# Patient Record
Sex: FEMALE | Race: WHITE
[De-identification: names, ages, dates, MRNs, and addresses within clinical notes are randomized per-mention and may not be internally consistent; named-entity substitution may affect disease eponyms.]

---

## 2019-01-16 ENCOUNTER — HOSPITAL ENCOUNTER (OUTPATIENT)
Dept: HOSPITAL 91 - SDS | Age: 44
Discharge: HOME | End: 2019-01-16
Payer: COMMERCIAL

## 2019-01-16 ENCOUNTER — HOSPITAL ENCOUNTER (OUTPATIENT)
Dept: HOSPITAL 10 - SDS | Age: 44
Discharge: HOME | End: 2019-01-16
Attending: PODIATRIST
Payer: COMMERCIAL

## 2019-01-16 VITALS — DIASTOLIC BLOOD PRESSURE: 88 MMHG | SYSTOLIC BLOOD PRESSURE: 141 MMHG | HEART RATE: 69 BPM | RESPIRATION RATE: 16 BRPM

## 2019-01-16 VITALS — HEART RATE: 78 BPM | RESPIRATION RATE: 28 BRPM | DIASTOLIC BLOOD PRESSURE: 73 MMHG | SYSTOLIC BLOOD PRESSURE: 128 MMHG

## 2019-01-16 VITALS — SYSTOLIC BLOOD PRESSURE: 125 MMHG | HEART RATE: 68 BPM | RESPIRATION RATE: 17 BRPM | DIASTOLIC BLOOD PRESSURE: 78 MMHG

## 2019-01-16 VITALS — HEART RATE: 68 BPM | SYSTOLIC BLOOD PRESSURE: 128 MMHG | RESPIRATION RATE: 21 BRPM | DIASTOLIC BLOOD PRESSURE: 75 MMHG

## 2019-01-16 VITALS — DIASTOLIC BLOOD PRESSURE: 70 MMHG | HEART RATE: 72 BPM | SYSTOLIC BLOOD PRESSURE: 130 MMHG | RESPIRATION RATE: 25 BRPM

## 2019-01-16 VITALS — DIASTOLIC BLOOD PRESSURE: 61 MMHG | SYSTOLIC BLOOD PRESSURE: 125 MMHG | HEART RATE: 80 BPM | RESPIRATION RATE: 42 BRPM

## 2019-01-16 VITALS — RESPIRATION RATE: 20 BRPM | DIASTOLIC BLOOD PRESSURE: 73 MMHG | HEART RATE: 97 BPM | SYSTOLIC BLOOD PRESSURE: 125 MMHG

## 2019-01-16 VITALS — HEART RATE: 76 BPM | RESPIRATION RATE: 28 BRPM | SYSTOLIC BLOOD PRESSURE: 127 MMHG | DIASTOLIC BLOOD PRESSURE: 71 MMHG

## 2019-01-16 VITALS — HEART RATE: 74 BPM | SYSTOLIC BLOOD PRESSURE: 142 MMHG | RESPIRATION RATE: 26 BRPM | DIASTOLIC BLOOD PRESSURE: 81 MMHG

## 2019-01-16 VITALS — RESPIRATION RATE: 18 BRPM | HEART RATE: 68 BPM | DIASTOLIC BLOOD PRESSURE: 80 MMHG | SYSTOLIC BLOOD PRESSURE: 119 MMHG

## 2019-01-16 VITALS — HEART RATE: 66 BPM | SYSTOLIC BLOOD PRESSURE: 116 MMHG | RESPIRATION RATE: 19 BRPM | DIASTOLIC BLOOD PRESSURE: 71 MMHG

## 2019-01-16 VITALS
BODY MASS INDEX: 30.22 KG/M2 | HEIGHT: 62 IN | WEIGHT: 164.24 LBS | WEIGHT: 164.24 LBS | BODY MASS INDEX: 30.22 KG/M2 | HEIGHT: 62 IN

## 2019-01-16 VITALS — HEART RATE: 80 BPM | DIASTOLIC BLOOD PRESSURE: 81 MMHG | SYSTOLIC BLOOD PRESSURE: 138 MMHG | RESPIRATION RATE: 33 BRPM

## 2019-01-16 VITALS — SYSTOLIC BLOOD PRESSURE: 140 MMHG | HEART RATE: 76 BPM | RESPIRATION RATE: 26 BRPM | DIASTOLIC BLOOD PRESSURE: 79 MMHG

## 2019-01-16 VITALS — DIASTOLIC BLOOD PRESSURE: 68 MMHG | SYSTOLIC BLOOD PRESSURE: 115 MMHG | RESPIRATION RATE: 18 BRPM | HEART RATE: 74 BPM

## 2019-01-16 VITALS — HEART RATE: 71 BPM | SYSTOLIC BLOOD PRESSURE: 127 MMHG | DIASTOLIC BLOOD PRESSURE: 75 MMHG | RESPIRATION RATE: 18 BRPM

## 2019-01-16 DIAGNOSIS — M19.071: Primary | ICD-10-CM

## 2019-01-16 PROCEDURE — 29891 ARTHR ANK OSTCHN DF TAL&/TIB: CPT

## 2019-01-16 PROCEDURE — 88304 TISSUE EXAM BY PATHOLOGIST: CPT

## 2019-01-16 PROCEDURE — 73610 X-RAY EXAM OF ANKLE: CPT

## 2019-01-16 PROCEDURE — 88311 DECALCIFY TISSUE: CPT

## 2019-01-16 RX ADMIN — ONDANSETRON HYDROCHLORIDE 1 MG: 2 INJECTION, SOLUTION INTRAMUSCULAR; INTRAVENOUS at 11:26

## 2019-01-16 RX ADMIN — BUPIVACAINE HYDROCHLORIDE AND EPINEPHRINE BITARTRATE 1 ML: 5; .005 INJECTION, SOLUTION EPIDURAL; INTRACAUDAL; PERINEURAL at 11:00

## 2019-01-16 RX ADMIN — MEPERIDINE HYDROCHLORIDE 1 MG: 25 INJECTION, SOLUTION INTRAMUSCULAR; INTRAVENOUS; SUBCUTANEOUS at 11:25

## 2019-01-16 RX ADMIN — FENTANYL CITRATE 1 MCG: 50 INJECTION, SOLUTION INTRAMUSCULAR; INTRAVENOUS at 12:02

## 2019-01-16 NOTE — OPPN
Date/Time of Note


Date/Time of Note


DATE: 1/16/19 


TIME: 11:07





Operative Report


Preoperative Diagnosis


Right ankle degenerative joint disease


Chronic right ankle pain


Postoperative Diagnosis


Right ankle degenerative joint disease


Chronic right ankle pain


Osteochondral defect of the right talus


Operation/Procedure Performed


Right ankle arthroscopy


Right ankle debridement arthroscopically


Repair of right talus osteochondral defect arthroscopically


Application of posterior splint right lower extremity


Surgeon


see signature line


First assist


None


Anesthesia:  general


Estimated blood loss:  0 - 10 ml's


Transfusion Required


   none


Specimen


Bony fragment right ankle joint


Grafts/Implants


none


Complications


none











NAYANA BONILLA DPM               Jan 16, 2019 11:09

## 2019-01-16 NOTE — PAC
Date/Time of Note


Date/Time of Note


DATE: 1/16/19 


TIME: 12:08





Post-Anesthesia Notes


Post-Anesthesia Note


Last documented vital signs





Vital Signs


  Date      Temp  Pulse  Resp  B/P (MAP)   Pulse Ox  O2          O2 Flow    FiO2


Time                                                 Delivery    Rate


   1/16/19  98.0


     11:14


   1/16/19           97    20      125/73       100  Mask              8.0


     11:09                           (90)





Activity:  WNL


Respiratory function:  WNL


Cardiovascular function:  WNL


Mental status:  Baseline


Pain reasonably controlled:  Yes


Hydration appropriate:  Yes


Nausea/Vomiting absent:  Yes











OMI TERESA               Jan 16, 2019 12:08

## 2019-01-16 NOTE — HPN
Date/Time of Note


Date/Time of Note


DATE: 1/16/19 


TIME: 09:17





Interval H&P Admission Note


Pt. seen H&P reviewed:  No system changes











NAYANA BONILLA DPM               Jan 16, 2019 09:17

## 2019-01-16 NOTE — PREAC
Date/Time of Note


Date/Time of Note


DATE: 1/16/19 


TIME: 09:03





Anesthesia Eval and Record


Evaluation


Time Pre-Procedure Interview


DATE: 1/16/19 


TIME: 09:03


Age


43


Sex


female


NPO:  8 hrs


Preoperative diagnosis


R Ankle Pain


Planned procedure


R Ankle Arthroscopy





Past Medical History


Past Medical History:  Includes


Cardio:  HTN


GI:  GERD





Surgery & Anesthesia Issues


No known issue





Meds


Anticoagulation:  No


Beta Blocker within 24 hr:  No


Reason Beta Blocker not given:  Pt. not on B-Blocker


Reported Medications


Omeprazole* (Omeprazole*) 40 Mg Capsule.dr, 40 MG PO DAILY, #30 CAP


   1/16/19


Discontinued Reported Medications


Magaldrate/Simethicone* (Mylanta*) 355 Ml Susp, 355 ML PO PRN


   11/16/12


Ferrous Sulfate (Iron) 1 Tab Tablet, 1 TAB PO BID


   11/16/12


Prenatal Vits W-Ca,Fe,Fa(<1MG) (Prenatal) 1 Tab Tablet, 1 TAB PO DAILY


   11/16/12


Prenatal Vits W-Ca,Fe,Fa(<1MG) (Prenatal) 1 Tab Tablet, 1 TAB PO DAILY


   11/16/12


Ferrous Sulfate (Iron) 1 Tab Tablet, 1 TAB PO


   8/27/12


Prenatal Vits W-Ca,Fe,Fa(<1MG) (Prenatal) 1 Tab Tablet, 1 TAB PO


   8/27/12


Meds reviewed:  Yes





Allergies


Coded Allergies:  


     No Known Drug Allergy (Unverified  Allergy, Unknown, 1/16/19)


Allergies Reviewed:  Yes





Labs/Studies


Labs Reviewed:  Reviewed by anesthesiologist


Pregnancy test:  Negative


Studies:  ECG





Pre-procedure Exam


Last vitals





Vital Signs


  Date      Temp  Pulse  Resp  B/P (MAP)   Pulse Ox  O2          O2 Flow    FiO2


Time                                                 Delivery    Rate


   1/16/19  98.6     69    16      141/88        96  Room Air


     08:28                          (105)





Airway:  Adequate mouth opening, Adequate thyromental dist


Mallampati:  Mallampati II


Teeth:  Normal


Lung:  Normal


Heart:  Normal





ASA Physical Status


ASA physical status:  2


Emergency:  None





Planned Anesthetic


General/MAC:  ETT, LMA





Pre-operative Attestations


Prior to commencing anesthesia and surgery, the patient was re-evaluated, there 


was verification of:


*The patient's identity


*The results of appropriate recent lab work and preoperative vital signs


*The above evaluation not changing prior to induction


*Anesthetic plan, risk benefits, alternative and complications discussed with 


patient/family; questions answered; patient/family understands, accepts and 


wishes to proceed.











OMI TERESA               Jan 16, 2019 09:04

## 2019-01-17 NOTE — OPR
DATE OF OPERATION:  01/16/2019

 

 

PREOPERATIVE DIAGNOSES:

1.  Right ankle degenerative joint disease.

2.  Right ankle chronic pain.

3.  Possible osteochondral defect of the right talus.

 

POSTOPERATIVE DIAGNOSES:

1.  Right ankle degenerative joint disease.

2.  Right ankle chronic pain.

3.  Possible osteochondral defect of the right talus.

 

OPERATIONS PERFORMED:

1.  Right ankle arthroscopy with debridement.

2.  Right ankle arthroscopic repair of osteochondral defect.

3.  Application of posterior splint.

 

SURGEON:  James Gilliam DPM

 

ASSISTANT:  None.

 

ANESTHESIA:  General.

 

ESTIMATED BLOOD LOSS:  Less than 20 mL.

 

HEMOSTASIS:  Pneumatic thigh tourniquet.



Procedure in detail:



Indications for surgery: This is a pleasant 43-year-old female patient who 

suffers from chronic right ankle pain and has had multiple injections with no 

relief of pain.  She reports pain with all types of activities and all types of 

shoes.  She is seeking surgical management at this time.  Recommended procedure 

is right ankle arthroscopy with debridement and exploration with possible repair

of osteochondral defect of the talus.  Risks and complications of this type 

surgery was discussed with patient in great detail including but not limited to 

postoperative pain, postoperative disability, chronic pain and disability, gait 

disturbance, failure of procedure to correct the problem, need for additional 

surgical procedures, possible need for total ankle replacement, deep venous 

thrombosis, limb loss and loss of life.  The patient understands the discussion 

and agrees to the procedure.  An informed consent was obtained, signed and 

placed in the chart.  No guarantee or warranty was given or implied as to the 

outcome of the procedure either and verbal or written form.



Procedure in detail:

The patient was seen in the preoperative area.  Proposed surgery was discussed 

with patient in great detail.  Opportunity was given to patient to ask questions

and all questions were answered.  Patient acknowledges understanding of the 

discussion.  An informed consent was obtained.



Patient was then taken to the operating room and was placed on the operative 

table in supine position.  General anesthesia was given by the anesthesiologist.

 A thigh tourniquet was applied to the right thigh.  The right lower extremity 

was scrubbed, prepped and draped in the usual aseptic manner.  An ankle 

distractor was applied to the table.  Attention was directed to the right ankle.

 The anatomical landmarks were identified and marked.  A lateral anterior portal

was started with a #11 blade followed by blunt dissection into the portal site 

using a Ольга.  The obturator and trocar was inserted into the ankle joint and 

the scope was inserted.  The joint was surveyed from medial to lateral and 

pictures were obtained.  Significant synovitis was noted along with significant 

cartilage damage of the talus along with an osteochondral defect on the dorsal 

medial aspect of the talus.  Further survey was done.  A medial anterior portal 

was created in a similar fashion.  The shaver was inserted and debridement 

commenced.  After debridement, I surveyed the joint from lateral to medial and 

pictures were obtained.  Next, the scope was inserted into the lateral anterior 

portal and instrumentation on to the medial portal.  The osteochondral defect 

was then found and loose cartilage was removed.  A bur was inserted and the area

of the defect was burred until I reached the subchondral bone.  I used a 90 

degree bone picks to create holes in the dome of the talus in the area of the 

defect.  Bleeding noted.  Next, the joint was then further evaluated and no 

further procedure was deemed necessary.  All instrumentation was removed from 

the ankle joint.  A 5-0 Monocryl suture was used to close the anteromedial and 

anterolateral portals in simple suture technique.  Postoperative injection of 

0.5% Marcaine with epinephrine was injected.  Sterile dressing was applied to 

the right ankle.  The thigh tourniquet was deflated at this time and prompt 

hyperemic response was noted to the digits of the right foot.



Patient Severo the procedure and anesthesia well.  She was transferred to the 

recovery with vital signs stable and vascular status intact to the right lower 

extremity.  The patient will be discharged home with postop orders to include 

nonweightbearing on the right lower extremity.  Prescription was submitted to 

the pharmacy.  Patient will be seen in 1 week in my office for postop follow-up.

 

 

Dictated By: JAMES SNEED

DD:    01/16/2019 11:14:19

DT:    01/16/2019 12:13:43

Conf#: [id number]

DID#:  [id number]

 

MTDD